# Patient Record
Sex: FEMALE | Race: WHITE | HISPANIC OR LATINO | URBAN - METROPOLITAN AREA
[De-identification: names, ages, dates, MRNs, and addresses within clinical notes are randomized per-mention and may not be internally consistent; named-entity substitution may affect disease eponyms.]

---

## 2017-01-28 ENCOUNTER — EMERGENCY (EMERGENCY)
Facility: HOSPITAL | Age: 27
LOS: 1 days | Discharge: HOME | End: 2017-01-28
Attending: EMERGENCY MEDICINE
Payer: MEDICAID

## 2017-01-28 VITALS
WEIGHT: 235.01 LBS | SYSTOLIC BLOOD PRESSURE: 149 MMHG | HEART RATE: 69 BPM | OXYGEN SATURATION: 100 % | DIASTOLIC BLOOD PRESSURE: 100 MMHG | HEIGHT: 65 IN | TEMPERATURE: 98 F | RESPIRATION RATE: 20 BRPM
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
## 2017-01-28 DIAGNOSIS — I10 ESSENTIAL (PRIMARY) HYPERTENSION: ICD-10-CM

## 2017-01-28 DIAGNOSIS — S61.411A LACERATION WITHOUT FOREIGN BODY OF RIGHT HAND, INITIAL ENCOUNTER: ICD-10-CM
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
## 2017-01-28 PROCEDURE — 12001 RPR S/N/AX/GEN/TRNK 2.5CM/<: CPT | Mod: RT

## 2017-01-28 PROCEDURE — 12001 RPR S/N/AX/GEN/TRNK 2.5CM/<: CPT

## 2017-01-28 PROCEDURE — 99283 EMERGENCY DEPT VISIT LOW MDM: CPT | Mod: 25

## 2017-01-28 PROCEDURE — 90715 TDAP VACCINE 7 YRS/> IM: CPT

## 2017-01-28 PROCEDURE — 90471 IMMUNIZATION ADMIN: CPT

## 2017-01-28 RX ORDER — TETANUS TOXOID, REDUCED DIPHTHERIA TOXOID AND ACELLULAR PERTUSSIS VACCINE, ADSORBED 5; 2.5; 8; 8; 2.5 [IU]/.5ML; [IU]/.5ML; UG/.5ML; UG/.5ML; UG/.5ML
0.5 SUSPENSION INTRAMUSCULAR ONCE
Qty: 0 | Refills: 0 | Status: COMPLETED | OUTPATIENT
Start: 2017-01-28 | End: 2017-01-28

## 2017-01-28 RX ADMIN — TETANUS TOXOID, REDUCED DIPHTHERIA TOXOID AND ACELLULAR PERTUSSIS VACCINE, ADSORBED 0.5 MILLILITER(S): 5; 2.5; 8; 8; 2.5 SUSPENSION INTRAMUSCULAR at 13:45

## 2017-01-28 NOTE — ED PROVIDER NOTE - PHYSICAL EXAMINATION
Alert and oriented, no apparent distress, normocephalic, atraumatic head, breathing comfortably, equal chest excursion, moving all extremities, no evidence of intoxication, no focal neurologic defecits, no active psychiatric issues  SKIN: R HAND: 2cm laceration in webbing of R hand between 1st and 2nd digit  MSK: R HAND: (-) no tendon involvement, FROM, distal radial and ulnar pulses intact, cap refill < 2 seconds, full range of motion of arm +elbow flexion/extension/supination and pronation intact, +flexion and extension of all digits at DIP and PIP.

## 2017-01-28 NOTE — ED PROVIDER NOTE - OBJECTIVE STATEMENT
28 y/o female with significant PMHx of HTN, presents to the ED c/o R hand laceration x today. Pt reports she was cutting chicken with knife on cutting board when hand accidentally slipped, sustaining laceration between webspace of  R 1st and R 2nd digit. Pt is R hand dominant. Pt denies numbness, weakness, tingling, or any other complaints. NKDA. Tetanus is not UTD as per pt. 26 y/o female with significant PMHx of HTN, presents to the ED c/o R hand laceration x today. Pt reports she was cutting chicken with knife on cutting board when hand accidentally slipped, sustaining laceration between webspace of  R 1st and R 2nd digit. Pt is R hand dominant. Pt relates she put coffee grounds in wound. Pt denies numbness, weakness, tingling, or any other complaints. NKDA. Tetanus is not UTD as per pt.

## 2017-01-28 NOTE — ED PROVIDER NOTE - PROGRESS NOTE DETAILS
Pt is feeling better. Return precautions reviewed with pt, including signs of infection information, and will d/c with PMD f/u.

## 2017-01-28 NOTE — ED PROVIDER NOTE - MEDICAL DECISION MAKING DETAILS
28 y/o F with laceration between R 1st and 2nd digit. Will update Tdap, and do laceration repair with sutures. No signs of infection or tendon involvement. 28 y/o F with laceration between R 1st and 2nd digit webbing. Will update Tdap, and do laceration repair with sutures. No signs of infection or tendon involvement. 26 y/o F with laceration between R 1st and 2nd digit webbing. Will update Tdap, and do laceration repair with sutures. No signs of infection or tendon involvement. laceration repaired. no complications. pt tolerated well. MSV intact s/p repair. no return for suture removal as are absorbable. pt tetanus updated. to f/u with pcp for wound check. return precautions reviewed. pt in agreement with plan.

## 2017-01-28 NOTE — ED PROVIDER NOTE - NS ED MD SCRIBE ATTENDING SCRIBE SECTIONS
REVIEW OF SYSTEMS/VITAL SIGNS( Pullset)/PHYSICAL EXAM/DISPOSITION/PAST MEDICAL/SURGICAL/SOCIAL HISTORY/HIV/HISTORY OF PRESENT ILLNESS

## 2017-01-28 NOTE — ED PROCEDURE NOTE - CPROC ED SITE PREP1
normal saline/sterile water/copious amounts of water; explored area and coffee grounds found in site

## 2017-01-28 NOTE — ED PROCEDURE NOTE - CPROC ED POST PROC CARE GUIDE1
Keep the cast/splint/dressing clean and dry./Verbal/written post procedure instructions were given to patient/caregiver.

## 2017-02-12 ENCOUNTER — EMERGENCY (EMERGENCY)
Facility: HOSPITAL | Age: 27
LOS: 1 days | Discharge: HOME | End: 2017-02-12
Attending: EMERGENCY MEDICINE
Payer: SELF-PAY

## 2017-02-12 VITALS
HEART RATE: 86 BPM | RESPIRATION RATE: 20 BRPM | OXYGEN SATURATION: 100 % | WEIGHT: 240.08 LBS | DIASTOLIC BLOOD PRESSURE: 91 MMHG | HEIGHT: 64 IN | TEMPERATURE: 98 F | SYSTOLIC BLOOD PRESSURE: 140 MMHG

## 2017-02-12 DIAGNOSIS — I10 ESSENTIAL (PRIMARY) HYPERTENSION: ICD-10-CM
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
## 2017-02-12 DIAGNOSIS — T81.89XA OTHER COMPLICATIONS OF PROCEDURES, NOT ELSEWHERE CLASSIFIED, INITIAL ENCOUNTER: ICD-10-CM
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
## 2017-02-12 PROCEDURE — 99283 EMERGENCY DEPT VISIT LOW MDM: CPT

## 2017-02-12 RX ORDER — CEPHALEXIN 500 MG
500 CAPSULE ORAL ONCE
Qty: 0 | Refills: 0 | Status: COMPLETED | OUTPATIENT
Start: 2017-02-12 | End: 2017-02-12

## 2017-02-12 RX ORDER — AZTREONAM 2 G
1 VIAL (EA) INJECTION
Qty: 14 | Refills: 0 | OUTPATIENT
Start: 2017-02-12 | End: 2017-02-19

## 2017-02-12 RX ORDER — CEPHALEXIN 500 MG
1 CAPSULE ORAL
Qty: 21 | Refills: 0 | OUTPATIENT
Start: 2017-02-12 | End: 2017-02-19

## 2017-02-12 RX ADMIN — Medication 500 MILLIGRAM(S): at 11:22

## 2017-02-12 RX ADMIN — Medication 1 TABLET(S): at 11:22

## 2017-02-12 NOTE — ED PROVIDER NOTE - PHYSICAL EXAMINATION
SKIN: mild erythema to R hand, small amount of induration and tenderness. No signs of tenosynovitis.

## 2017-02-12 NOTE — ED PROVIDER NOTE - MEDICAL DECISION MAKING DETAILS
Pt with R hand embedded sutures. Will remove sutures and there is no signs of tenosynovitis. Pt with R hand embedded sutures. Will remove sutures and there is no signs of tenosynovitis, will d/c pt home with Abx. Pt with R hand embedded sutures. Will remove sutures, no signs of tenosynovitis. FB granuloma vs initial infection. Will d/c pt home with Abx and Hand Sx f/u as needed

## 2017-02-12 NOTE — ED PROVIDER NOTE - OBJECTIVE STATEMENT
26 y/o female with PMHx of HTN presents to the ED c/o R hand wound check today. Pt reports 2 weeks ago she has laceration repair and had 5 absorbable sutures placed to her R hand. Pt was not put on Abx. Pt comes in today as sutures have not absorbed romie now there is pain, redness and pus from R hand. Pt denies fever, chills, numbness, tingling, weakness, or any other complaints. NKDA.

## 2017-02-12 NOTE — ED PROVIDER NOTE - NS ED MD SCRIBE ATTENDING SCRIBE SECTIONS
REVIEW OF SYSTEMS/DISPOSITION/HISTORY OF PRESENT ILLNESS/PHYSICAL EXAM/HIV/PAST MEDICAL/SURGICAL/SOCIAL HISTORY/VITAL SIGNS( Pullset)

## 2017-02-12 NOTE — ED ADULT NURSE NOTE - OBJECTIVE STATEMENT
Patient presents with wound check to right hand. 4 sutures noted to right hand. No redness, swelling. bleeding or drainage noted. Denies fever, chills, numbness, tingling sensation. Able to move all fingers of right hand. Patient looks comfortable.

## 2017-02-12 NOTE — ED ADULT TRIAGE NOTE - CHIEF COMPLAINT QUOTE
pain with difficulty with  of right hand since yesterday.  Patient had repair of laceration 2 weeks ago.  Noticed purulent drainage from wound yesterday

## 2017-02-26 NOTE — ED PROVIDER NOTE - NS ED MD DISPO DISCHARGE
Patient's first and last name, , procedure, and correct site confirmed prior to the start of procedure. Home

## 2017-09-12 ENCOUNTER — EMERGENCY (EMERGENCY)
Facility: HOSPITAL | Age: 27
LOS: 1 days | Discharge: HOME | End: 2017-09-12
Attending: EMERGENCY MEDICINE
Payer: SELF-PAY

## 2017-09-12 VITALS
OXYGEN SATURATION: 100 % | TEMPERATURE: 99 F | SYSTOLIC BLOOD PRESSURE: 116 MMHG | HEART RATE: 92 BPM | DIASTOLIC BLOOD PRESSURE: 69 MMHG | RESPIRATION RATE: 16 BRPM

## 2017-09-12 VITALS
SYSTOLIC BLOOD PRESSURE: 128 MMHG | DIASTOLIC BLOOD PRESSURE: 82 MMHG | TEMPERATURE: 97 F | RESPIRATION RATE: 18 BRPM | HEART RATE: 91 BPM | OXYGEN SATURATION: 100 %
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
## 2017-09-12 DIAGNOSIS — R07.89 OTHER CHEST PAIN: ICD-10-CM

## 2017-09-12 DIAGNOSIS — I10 ESSENTIAL (PRIMARY) HYPERTENSION: ICD-10-CM

## 2017-09-12 LAB
ALBUMIN SERPL ELPH-MCNC: 3.3 G/DL — LOW (ref 3.5–5)
ALP SERPL-CCNC: 104 U/L — SIGNIFICANT CHANGE UP (ref 40–120)
ALT FLD-CCNC: 19 U/L DA — SIGNIFICANT CHANGE UP (ref 10–60)
ANION GAP SERPL CALC-SCNC: 9 MMOL/L — SIGNIFICANT CHANGE UP (ref 5–17)
AST SERPL-CCNC: 28 U/L — SIGNIFICANT CHANGE UP (ref 10–40)
BILIRUB SERPL-MCNC: 0.8 MG/DL — SIGNIFICANT CHANGE UP (ref 0.2–1.2)
BUN SERPL-MCNC: 9 MG/DL — SIGNIFICANT CHANGE UP (ref 7–18)
CALCIUM SERPL-MCNC: 8.6 MG/DL — SIGNIFICANT CHANGE UP (ref 8.4–10.5)
CHLORIDE SERPL-SCNC: 106 MMOL/L — SIGNIFICANT CHANGE UP (ref 96–108)
CO2 SERPL-SCNC: 23 MMOL/L — SIGNIFICANT CHANGE UP (ref 22–31)
CREAT SERPL-MCNC: 0.62 MG/DL — SIGNIFICANT CHANGE UP (ref 0.5–1.3)
D DIMER BLD IA.RAPID-MCNC: 418 NG/ML DDU — HIGH
GLUCOSE SERPL-MCNC: 87 MG/DL — SIGNIFICANT CHANGE UP (ref 70–99)
HCG UR QL: NEGATIVE — SIGNIFICANT CHANGE UP
HCT VFR BLD CALC: 37.6 % — SIGNIFICANT CHANGE UP (ref 34.5–45)
HGB BLD-MCNC: 11.8 G/DL — SIGNIFICANT CHANGE UP (ref 11.5–15.5)
MCHC RBC-ENTMCNC: 27.2 PG — SIGNIFICANT CHANGE UP (ref 27–34)
MCHC RBC-ENTMCNC: 31.4 GM/DL — LOW (ref 32–36)
MCV RBC AUTO: 86.7 FL — SIGNIFICANT CHANGE UP (ref 80–100)
PLATELET # BLD AUTO: 378 K/UL — SIGNIFICANT CHANGE UP (ref 150–400)
POTASSIUM SERPL-MCNC: 4 MMOL/L — SIGNIFICANT CHANGE UP (ref 3.5–5.3)
POTASSIUM SERPL-SCNC: 4 MMOL/L — SIGNIFICANT CHANGE UP (ref 3.5–5.3)
PROT SERPL-MCNC: 7.8 G/DL — SIGNIFICANT CHANGE UP (ref 6–8.3)
RBC # BLD: 4.33 M/UL — SIGNIFICANT CHANGE UP (ref 3.8–5.2)
RBC # FLD: 15.2 % — HIGH (ref 10.3–14.5)
SODIUM SERPL-SCNC: 138 MMOL/L — SIGNIFICANT CHANGE UP (ref 135–145)
WBC # BLD: 14.1 K/UL — HIGH (ref 3.8–10.5)
WBC # FLD AUTO: 14.1 K/UL — HIGH (ref 3.8–10.5)

## 2017-09-12 PROCEDURE — 71275 CT ANGIOGRAPHY CHEST: CPT | Mod: 26

## 2017-09-12 PROCEDURE — 99284 EMERGENCY DEPT VISIT MOD MDM: CPT

## 2017-09-12 PROCEDURE — 93005 ELECTROCARDIOGRAM TRACING: CPT

## 2017-09-12 PROCEDURE — 81025 URINE PREGNANCY TEST: CPT

## 2017-09-12 PROCEDURE — 71275 CT ANGIOGRAPHY CHEST: CPT

## 2017-09-12 PROCEDURE — 99284 EMERGENCY DEPT VISIT MOD MDM: CPT | Mod: 25

## 2017-09-12 PROCEDURE — 71046 X-RAY EXAM CHEST 2 VIEWS: CPT

## 2017-09-12 PROCEDURE — 85379 FIBRIN DEGRADATION QUANT: CPT

## 2017-09-12 PROCEDURE — 85027 COMPLETE CBC AUTOMATED: CPT

## 2017-09-12 PROCEDURE — 80053 COMPREHEN METABOLIC PANEL: CPT

## 2017-09-12 PROCEDURE — 36000 PLACE NEEDLE IN VEIN: CPT

## 2017-09-12 PROCEDURE — 71020: CPT | Mod: 26

## 2017-09-12 RX ORDER — SODIUM CHLORIDE 9 MG/ML
1000 INJECTION INTRAMUSCULAR; INTRAVENOUS; SUBCUTANEOUS ONCE
Qty: 0 | Refills: 0 | Status: COMPLETED | OUTPATIENT
Start: 2017-09-12 | End: 2017-09-12

## 2017-09-12 RX ORDER — IBUPROFEN 200 MG
1 TABLET ORAL
Qty: 30 | Refills: 0 | OUTPATIENT
Start: 2017-09-12

## 2017-09-12 RX ORDER — AZITHROMYCIN 500 MG/1
1 TABLET, FILM COATED ORAL
Qty: 6 | Refills: 0 | OUTPATIENT
Start: 2017-09-12 | End: 2017-09-17

## 2017-09-12 RX ORDER — AZITHROMYCIN 500 MG/1
500 TABLET, FILM COATED ORAL ONCE
Qty: 0 | Refills: 0 | Status: COMPLETED | OUTPATIENT
Start: 2017-09-12 | End: 2017-09-12

## 2017-09-12 RX ORDER — METHOCARBAMOL 500 MG/1
750 TABLET, FILM COATED ORAL ONCE
Qty: 0 | Refills: 0 | Status: COMPLETED | OUTPATIENT
Start: 2017-09-12 | End: 2017-09-12

## 2017-09-12 RX ORDER — IBUPROFEN 200 MG
600 TABLET ORAL ONCE
Qty: 0 | Refills: 0 | Status: COMPLETED | OUTPATIENT
Start: 2017-09-12 | End: 2017-09-12

## 2017-09-12 RX ADMIN — Medication 600 MILLIGRAM(S): at 15:46

## 2017-09-12 RX ADMIN — SODIUM CHLORIDE 1000 MILLILITER(S): 9 INJECTION INTRAMUSCULAR; INTRAVENOUS; SUBCUTANEOUS at 17:56

## 2017-09-12 RX ADMIN — Medication 600 MILLIGRAM(S): at 13:55

## 2017-09-12 RX ADMIN — METHOCARBAMOL 750 MILLIGRAM(S): 500 TABLET, FILM COATED ORAL at 13:55

## 2017-09-12 RX ADMIN — AZITHROMYCIN 500 MILLIGRAM(S): 500 TABLET, FILM COATED ORAL at 15:46

## 2017-09-12 NOTE — ED PROVIDER NOTE - OBJECTIVE STATEMENT
he patient is a 27y Female complaining of left sided rib pain that started immediately after she was lifting some heavy bags 1 day ago.   Pt states pain is in her left rib and radiates to her back.  Pt has difficulty with deep inspiration due to pain.  No fever or chills.  No leg swelling.  No palpitations.  No n/v/d.  Pt denies OCP use.

## 2017-09-12 NOTE — ED PROVIDER NOTE - CHIEF COMPLAINT
The patient is a 27y Female complaining of left sided rib pain that started immediately after she was lifting some heavy bags 1 day ago.   Pt states pain is in her left rib and radiates to her back.  Pt has difficulty with deep inspiration due to pain.  No fever or chills.  No leg swelling.  No palpitations.  No n/v/d.  Pt denies OCP use. The patient is a 27y Female complaining of left sided rib pain

## 2017-09-12 NOTE — ED PROVIDER NOTE - ATTENDING CONTRIBUTION TO CARE
Patient presenting with left sided chest wall setting after heavy lift. VSS. CTAB, no crepitus, + ttp of lateral chest wall. CXR neg. Will d/c with OTC NSAIDS and APAP for pain. Patient presenting with left sided chest wall setting after heavy lift. VSS. CTAB, no crepitus, + ttp of lateral chest wall. CXR neg. Will d/c with OTC NSAIDS and APAP for pain.    Downtime VS aded- temp 101. CXR poss PNA. Will add azithro. Patient presenting with left sided chest wall setting after heavy lift. VSS. CTAB, no crepitus, + ttp of lateral chest wall. CXR neg. Will d/c with OTC NSAIDS and APAP for pain.    Downtime initial VS added late- temp 101. CXR poss PNA. Will add azithro. Also still a little tachypneic. Checking labs, poss PE with pulm infarct (vs. slight PNA and chest wall strain).

## 2017-09-12 NOTE — ED PROVIDER NOTE - MEDICAL DECISION MAKING DETAILS
Pt pain improved after motrin and muscle relaxant.  will discharge on both and pt will follow with pmd Pt pain improved after motrin and muscle relaxant.  will discharge on both and pt will follow with pmd.    Upon beginning of discharge process, pt xray read as pna,  will give abx.  Pt is also persistently tachypnic.  Will send blood work and d-dimer

## 2022-05-04 ENCOUNTER — EMERGENCY (EMERGENCY)
Facility: HOSPITAL | Age: 32
LOS: 1 days | Discharge: HOME | End: 2022-05-04
Attending: STUDENT IN AN ORGANIZED HEALTH CARE EDUCATION/TRAINING PROGRAM
Payer: COMMERCIAL

## 2022-05-04 PROCEDURE — 99285 EMERGENCY DEPT VISIT HI MDM: CPT

## 2022-05-05 VITALS
SYSTOLIC BLOOD PRESSURE: 134 MMHG | HEART RATE: 61 BPM | OXYGEN SATURATION: 100 % | DIASTOLIC BLOOD PRESSURE: 90 MMHG | RESPIRATION RATE: 18 BRPM | TEMPERATURE: 97 F

## 2022-05-05 VITALS
HEART RATE: 68 BPM | WEIGHT: 201.72 LBS | TEMPERATURE: 98 F | DIASTOLIC BLOOD PRESSURE: 77 MMHG | SYSTOLIC BLOOD PRESSURE: 128 MMHG | RESPIRATION RATE: 17 BRPM | HEIGHT: 65 IN | OXYGEN SATURATION: 98 %

## 2022-05-05 LAB
ALBUMIN SERPL ELPH-MCNC: 3.5 G/DL — SIGNIFICANT CHANGE UP (ref 3.5–5)
ALP SERPL-CCNC: 95 U/L — SIGNIFICANT CHANGE UP (ref 40–120)
ALT FLD-CCNC: 19 U/L DA — SIGNIFICANT CHANGE UP (ref 10–60)
ANION GAP SERPL CALC-SCNC: 6 MMOL/L — SIGNIFICANT CHANGE UP (ref 5–17)
ANISOCYTOSIS BLD QL: SLIGHT — SIGNIFICANT CHANGE UP
AST SERPL-CCNC: 9 U/L — LOW (ref 10–40)
BASOPHILS # BLD AUTO: 0.15 K/UL — SIGNIFICANT CHANGE UP (ref 0–0.2)
BASOPHILS NFR BLD AUTO: 1.4 % — SIGNIFICANT CHANGE UP (ref 0–2)
BILIRUB SERPL-MCNC: 0.4 MG/DL — SIGNIFICANT CHANGE UP (ref 0.2–1.2)
BUN SERPL-MCNC: 15 MG/DL — SIGNIFICANT CHANGE UP (ref 7–18)
CALCIUM SERPL-MCNC: 8.8 MG/DL — SIGNIFICANT CHANGE UP (ref 8.4–10.5)
CHLORIDE SERPL-SCNC: 111 MMOL/L — HIGH (ref 96–108)
CO2 SERPL-SCNC: 24 MMOL/L — SIGNIFICANT CHANGE UP (ref 22–31)
CREAT SERPL-MCNC: 0.75 MG/DL — SIGNIFICANT CHANGE UP (ref 0.5–1.3)
DACRYOCYTES BLD QL SMEAR: SLIGHT — SIGNIFICANT CHANGE UP
EGFR: 108 ML/MIN/1.73M2 — SIGNIFICANT CHANGE UP
ELLIPTOCYTES BLD QL SMEAR: SLIGHT — SIGNIFICANT CHANGE UP
EOSINOPHIL # BLD AUTO: 0.18 K/UL — SIGNIFICANT CHANGE UP (ref 0–0.5)
EOSINOPHIL NFR BLD AUTO: 1.7 % — SIGNIFICANT CHANGE UP (ref 0–6)
GLUCOSE SERPL-MCNC: 90 MG/DL — SIGNIFICANT CHANGE UP (ref 70–99)
HCG SERPL-ACNC: <1 MIU/ML — SIGNIFICANT CHANGE UP
HCT VFR BLD CALC: 29.2 % — LOW (ref 34.5–45)
HGB BLD-MCNC: 8.4 G/DL — LOW (ref 11.5–15.5)
HYPOCHROMIA BLD QL: SIGNIFICANT CHANGE UP
IMM GRANULOCYTES NFR BLD AUTO: 0.2 % — SIGNIFICANT CHANGE UP (ref 0–1.5)
LACTATE SERPL-SCNC: 0.8 MMOL/L — SIGNIFICANT CHANGE UP (ref 0.7–2)
LYMPHOCYTES # BLD AUTO: 3.47 K/UL — HIGH (ref 1–3.3)
LYMPHOCYTES # BLD AUTO: 32.8 % — SIGNIFICANT CHANGE UP (ref 13–44)
MANUAL SMEAR VERIFICATION: SIGNIFICANT CHANGE UP
MCHC RBC-ENTMCNC: 20.4 PG — LOW (ref 27–34)
MCHC RBC-ENTMCNC: 28.8 GM/DL — LOW (ref 32–36)
MCV RBC AUTO: 70.9 FL — LOW (ref 80–100)
MICROCYTES BLD QL: SIGNIFICANT CHANGE UP
MONOCYTES # BLD AUTO: 0.85 K/UL — SIGNIFICANT CHANGE UP (ref 0–0.9)
MONOCYTES NFR BLD AUTO: 8 % — SIGNIFICANT CHANGE UP (ref 2–14)
NEUTROPHILS # BLD AUTO: 5.92 K/UL — SIGNIFICANT CHANGE UP (ref 1.8–7.4)
NEUTROPHILS NFR BLD AUTO: 55.9 % — SIGNIFICANT CHANGE UP (ref 43–77)
NRBC # BLD: 0 /100 WBCS — SIGNIFICANT CHANGE UP (ref 0–0)
PLAT MORPH BLD: NORMAL — SIGNIFICANT CHANGE UP
PLATELET # BLD AUTO: 514 K/UL — HIGH (ref 150–400)
POIKILOCYTOSIS BLD QL AUTO: SLIGHT — SIGNIFICANT CHANGE UP
POTASSIUM SERPL-MCNC: 3.8 MMOL/L — SIGNIFICANT CHANGE UP (ref 3.5–5.3)
POTASSIUM SERPL-SCNC: 3.8 MMOL/L — SIGNIFICANT CHANGE UP (ref 3.5–5.3)
PROT SERPL-MCNC: 7.3 G/DL — SIGNIFICANT CHANGE UP (ref 6–8.3)
RBC # BLD: 4.12 M/UL — SIGNIFICANT CHANGE UP (ref 3.8–5.2)
RBC # FLD: 18.9 % — HIGH (ref 10.3–14.5)
RBC BLD AUTO: ABNORMAL
SODIUM SERPL-SCNC: 141 MMOL/L — SIGNIFICANT CHANGE UP (ref 135–145)
WBC # BLD: 10.59 K/UL — HIGH (ref 3.8–10.5)
WBC # FLD AUTO: 10.59 K/UL — HIGH (ref 3.8–10.5)

## 2022-05-05 PROCEDURE — 85025 COMPLETE CBC W/AUTO DIFF WBC: CPT

## 2022-05-05 PROCEDURE — 36415 COLL VENOUS BLD VENIPUNCTURE: CPT

## 2022-05-05 PROCEDURE — 80053 COMPREHEN METABOLIC PANEL: CPT

## 2022-05-05 PROCEDURE — 83605 ASSAY OF LACTIC ACID: CPT

## 2022-05-05 PROCEDURE — 96365 THER/PROPH/DIAG IV INF INIT: CPT | Mod: XU

## 2022-05-05 PROCEDURE — 96375 TX/PRO/DX INJ NEW DRUG ADDON: CPT

## 2022-05-05 PROCEDURE — 99284 EMERGENCY DEPT VISIT MOD MDM: CPT | Mod: 25

## 2022-05-05 PROCEDURE — 74177 CT ABD & PELVIS W/CONTRAST: CPT | Mod: 26,MA

## 2022-05-05 PROCEDURE — 74177 CT ABD & PELVIS W/CONTRAST: CPT | Mod: MA

## 2022-05-05 PROCEDURE — 84702 CHORIONIC GONADOTROPIN TEST: CPT

## 2022-05-05 RX ORDER — LIDOCAINE HCL 20 MG/ML
10 VIAL (ML) INJECTION ONCE
Refills: 0 | Status: COMPLETED | OUTPATIENT
Start: 2022-05-05 | End: 2022-05-05

## 2022-05-05 RX ORDER — SODIUM CHLORIDE 9 MG/ML
3 INJECTION INTRAMUSCULAR; INTRAVENOUS; SUBCUTANEOUS EVERY 8 HOURS
Refills: 0 | Status: DISCONTINUED | OUTPATIENT
Start: 2022-05-05 | End: 2022-05-08

## 2022-05-05 RX ORDER — KETOROLAC TROMETHAMINE 30 MG/ML
15 SYRINGE (ML) INJECTION ONCE
Refills: 0 | Status: DISCONTINUED | OUTPATIENT
Start: 2022-05-05 | End: 2022-05-05

## 2022-05-05 RX ORDER — SODIUM CHLORIDE 9 MG/ML
1000 INJECTION INTRAMUSCULAR; INTRAVENOUS; SUBCUTANEOUS ONCE
Refills: 0 | Status: COMPLETED | OUTPATIENT
Start: 2022-05-05 | End: 2022-05-05

## 2022-05-05 RX ADMIN — SODIUM CHLORIDE 3 MILLILITER(S): 9 INJECTION INTRAMUSCULAR; INTRAVENOUS; SUBCUTANEOUS at 05:22

## 2022-05-05 RX ADMIN — Medication 100 MILLIGRAM(S): at 02:32

## 2022-05-05 RX ADMIN — Medication 15 MILLIGRAM(S): at 02:32

## 2022-05-05 RX ADMIN — Medication 600 MILLIGRAM(S): at 02:52

## 2022-05-05 RX ADMIN — SODIUM CHLORIDE 1000 MILLILITER(S): 9 INJECTION INTRAMUSCULAR; INTRAVENOUS; SUBCUTANEOUS at 02:28

## 2022-05-05 RX ADMIN — SODIUM CHLORIDE 1000 MILLILITER(S): 9 INJECTION INTRAMUSCULAR; INTRAVENOUS; SUBCUTANEOUS at 03:28

## 2022-05-05 RX ADMIN — Medication 10 MILLILITER(S): at 05:11

## 2022-05-05 RX ADMIN — Medication 15 MILLIGRAM(S): at 03:02

## 2022-05-05 NOTE — ED PROVIDER NOTE - NSFOLLOWUPINSTRUCTIONS_ED_ALL_ED_FT
You were seen in the emergency room today for a small abscess. Please call your primary doctor to inform them of this ER visit and obtain the next available appointment within the next 3 days. As we discussed, return to the ER if you have any worsening symptoms.    Please  your prescription and take as directed. Immediately seek medical attention or return to the ER if you have signs or symptoms of an allergic reaction after taking the medication (including- rash or swelling, wheezing or shortness or breath, vomiting or belly pain).    Please take Probiotics with this antibiotic to prevent diarrhea.    We no longer feel that you need further emergency care or admission to the hospital at this time.    While we have determined that you are currently stable for discharge, we know that things can change. Please seek immediate medical attention or return to the ER if you experience any of the following:  Any worsening or persistent symptoms  Severe Pain  Chest Pain  Difficulty Breathing  Bleeding  Passing Out  Severe Rash  Inability to Eat or Drink  Persistent Fever    Please see a primary care doctor or specialist within 3 days to ensure that you are improving.    Please call the [facility] phone numbers on this document if you have any problems obtaining a follow up appointment.    I wish you well! -Dr Gilbert

## 2022-05-05 NOTE — ED PROVIDER NOTE - PHYSICAL EXAMINATION
Vital Signs Reviewed  GEN: Comfortable, NAD, AAOx3  HEENT: NCAT, EOMI, MMM, Neck Supple  RESP: CTAB, No rales/rhonchi/wheezing  CV: RRR, S1S2, No murmurs  ABD: Mild tenderness to palpation around umbilicus with small amount of pus expressed, no erythema or induration, ND, No masses, No CVA Tenderness  Extrem/Skin: Equal pulses bilat, No cyanosis/edema/rashes  Neuro: No focal deficits

## 2022-05-05 NOTE — ED PROVIDER NOTE - CLINICAL SUMMARY MEDICAL DECISION MAKING FREE TEXT BOX
Patient presenting with post drainage from umbilicus 5 years after surgery near this location. Labs showing slight white count. Hemoglobin 8, microcytic. CT pending. Patient stable, will reassess. Patient presenting with post drainage from umbilicus 5 years after surgery near this location. Labs showing slight white count. Hemoglobin 8, microcytic. CT pending. Patient stable, will reassess.    Labs showing mild WBC elevation and anemia (pt with chronic Fe deficiency anemia) and otw unremarkable. CT showing <1cm abscess on superior aspect of umbilicus. On reassessment the abscess is actively draining. Probed the abscess with 20g needle and small amount of pus drained. Rx clinda and rec PMD f/u within 3 days. Discussed with pt my clinical impression and results, patient given strict return precautions if persistent or worsening of symptoms occurs, and need for close follow up. Pt expressed understanding and agrees with plan. Pt is well appearing with a reassuring exam. Discharge home with PMD or Specialist f/u within 3 days.

## 2022-05-05 NOTE — ED PROVIDER NOTE - PATIENT PORTAL LINK FT
You can access the FollowMyHealth Patient Portal offered by [facility] by registering at the following website: [email or web address]. By joining [facility]’s FollowMyHealth portal, you will also be able to view your health information using other applications (apps) compatible with our system.

## 2022-05-05 NOTE — ED PROVIDER NOTE - OBJECTIVE STATEMENT
32 year old female with PMHx of hypertension and PSHx of "tummy tuck 5 years ago with decreased sensation around surgical site" presents to the ED with complaints of post drainage from umbilicus and pain since 11pm yesterday. Patient denies any recent abscess or any other associated symptoms. Patient denies any fevers, recent antibiotics, nausea, vomiting, diarrhea, chest pain, shortness of breath, syncope, or any recent illness or hospitalization. NKDA.

## 2022-05-05 NOTE — ED PROVIDER NOTE - CHPI ED SYMPTOMS NEG
no abscess, recent antibiotics, chest pain, shortness of breath, syncope/no diarrhea/no fever/no nausea/no vomiting

## 2024-05-21 NOTE — ED ADULT NURSE NOTE - CAS TRG GENERAL NORM CIRC DET
Last OV: 5/13/2024  Next OV: Visit date not found    Next appointment due: 05/05/2024    Last fill:03/19/2024  Refills: 1       Capillary refill less/equal to 2 seconds/Strong peripheral pulses
